# Patient Record
Sex: FEMALE | Race: WHITE | Employment: UNEMPLOYED | URBAN - METROPOLITAN AREA
[De-identification: names, ages, dates, MRNs, and addresses within clinical notes are randomized per-mention and may not be internally consistent; named-entity substitution may affect disease eponyms.]

---

## 2021-02-24 ENCOUNTER — TRANSCRIBE ORDER (OUTPATIENT)
Dept: SCHEDULING | Age: 71
End: 2021-02-24

## 2024-10-23 ENCOUNTER — TRANSCRIBE ORDERS (OUTPATIENT)
Dept: SCHEDULING | Age: 74
End: 2024-10-23

## 2024-10-23 DIAGNOSIS — Z12.31 VISIT FOR SCREENING MAMMOGRAM: Primary | ICD-10-CM

## 2025-01-09 ENCOUNTER — HOSPITAL ENCOUNTER (EMERGENCY)
Age: 75
Discharge: HOME | End: 2025-01-09
Attending: EMERGENCY MEDICINE
Payer: MEDICARE

## 2025-01-09 ENCOUNTER — APPOINTMENT (OUTPATIENT)
Dept: CT IMAGING | Age: 75
End: 2025-01-09
Payer: MEDICARE

## 2025-01-09 VITALS
DIASTOLIC BLOOD PRESSURE: 61 MMHG | SYSTOLIC BLOOD PRESSURE: 159 MMHG | HEART RATE: 80 BPM | OXYGEN SATURATION: 98 % | RESPIRATION RATE: 18 BRPM | TEMPERATURE: 97.8 F

## 2025-01-09 DIAGNOSIS — M54.2 NECK PAIN: ICD-10-CM

## 2025-01-09 DIAGNOSIS — R51.9 ACUTE NONINTRACTABLE HEADACHE, UNSPECIFIED HEADACHE TYPE: Primary | ICD-10-CM

## 2025-01-09 PROCEDURE — 70450 CT HEAD/BRAIN W/O DYE: CPT

## 2025-01-09 PROCEDURE — 96372 THER/PROPH/DIAG INJ SC/IM: CPT

## 2025-01-09 PROCEDURE — 6360000002 HC RX W HCPCS: Performed by: EMERGENCY MEDICINE

## 2025-01-09 PROCEDURE — 72125 CT NECK SPINE W/O DYE: CPT

## 2025-01-09 PROCEDURE — 99284 EMERGENCY DEPT VISIT MOD MDM: CPT

## 2025-01-09 RX ORDER — CYCLOBENZAPRINE HCL 10 MG
10 TABLET ORAL 3 TIMES DAILY PRN
Qty: 21 TABLET | Refills: 0 | Status: SHIPPED | OUTPATIENT
Start: 2025-01-09 | End: 2025-01-19

## 2025-01-09 RX ORDER — PROCHLORPERAZINE EDISYLATE 5 MG/ML
10 INJECTION INTRAMUSCULAR; INTRAVENOUS
Status: COMPLETED | OUTPATIENT
Start: 2025-01-09 | End: 2025-01-09

## 2025-01-09 RX ORDER — KETOROLAC TROMETHAMINE 30 MG/ML
30 INJECTION, SOLUTION INTRAMUSCULAR; INTRAVENOUS
Status: COMPLETED | OUTPATIENT
Start: 2025-01-09 | End: 2025-01-09

## 2025-01-09 RX ADMIN — KETOROLAC TROMETHAMINE 30 MG: 30 INJECTION, SOLUTION INTRAMUSCULAR at 18:41

## 2025-01-09 RX ADMIN — PROCHLORPERAZINE EDISYLATE 10 MG: 5 INJECTION INTRAMUSCULAR; INTRAVENOUS at 18:40

## 2025-01-09 ASSESSMENT — ENCOUNTER SYMPTOMS
VOMITING: 0
BOWEL INCONTINENCE: 0
SORE THROAT: 0
BACK PAIN: 0
COLOR CHANGE: 0
VISUAL CHANGE: 0
VOICE CHANGE: 0
TROUBLE SWALLOWING: 0
PHOTOPHOBIA: 0
NAUSEA: 0
SHORTNESS OF BREATH: 0
CONSTIPATION: 0
COUGH: 0
DIARRHEA: 0
FACIAL SWELLING: 0
ABDOMINAL PAIN: 0
RHINORRHEA: 0

## 2025-01-09 ASSESSMENT — LIFESTYLE VARIABLES
HOW MANY STANDARD DRINKS CONTAINING ALCOHOL DO YOU HAVE ON A TYPICAL DAY: PATIENT DOES NOT DRINK
HOW OFTEN DO YOU HAVE A DRINK CONTAINING ALCOHOL: NEVER

## 2025-01-09 ASSESSMENT — PAIN DESCRIPTION - LOCATION: LOCATION: HEAD

## 2025-01-09 ASSESSMENT — PAIN SCALES - GENERAL: PAINLEVEL_OUTOF10: 7

## 2025-01-09 ASSESSMENT — PAIN DESCRIPTION - DESCRIPTORS: DESCRIPTORS: ACHING

## 2025-01-09 NOTE — ED TRIAGE NOTES
Pt presents to triage via WC c/o intermittent right sided head, neck and shoulder pain and itching xs 3 days. Pt denies fall or mechanical injury.

## 2025-01-09 NOTE — ED PROVIDER NOTES
Constitutional:  Negative for chills and fever.   HENT:  Negative for dental problem, ear pain, facial swelling, rhinorrhea, sore throat, trouble swallowing and voice change.    Eyes:  Negative for photophobia and visual disturbance.   Respiratory:  Negative for cough and shortness of breath.    Cardiovascular:  Negative for chest pain and palpitations.   Gastrointestinal:  Negative for abdominal pain, bowel incontinence, constipation, diarrhea, nausea and vomiting.   Genitourinary:  Negative for bladder incontinence, dysuria and hematuria.   Musculoskeletal:  Positive for neck pain. Negative for back pain.   Skin:  Negative for color change and rash.   Neurological:  Positive for headaches. Negative for tingling, weakness and numbness.   Psychiatric/Behavioral:  Negative for confusion.    All other systems reviewed and are negative.       Physical Exam     Vitals signs and nursing note reviewed:  Vitals:    01/09/25 1457 01/09/25 1501   BP: (!) 143/62    Pulse: 95    Resp:  17   Temp: 97.7 °F (36.5 °C)    TempSrc: Oral    SpO2: 96%       Physical Exam  Vitals and nursing note reviewed.   Constitutional:       Appearance: Normal appearance.   HENT:      Head: Normocephalic and atraumatic.      Right Ear: Tympanic membrane normal.      Nose: Nose normal.      Mouth/Throat:      Mouth: Mucous membranes are dry.      Pharynx: No posterior oropharyngeal erythema.   Eyes:      Extraocular Movements: Extraocular movements intact.      Pupils: Pupils are equal, round, and reactive to light.   Cardiovascular:      Rate and Rhythm: Normal rate and regular rhythm.   Pulmonary:      Effort: Pulmonary effort is normal.      Breath sounds: Normal breath sounds. No wheezing.   Abdominal:      General: Bowel sounds are normal.      Palpations: Abdomen is soft.      Tenderness: There is no abdominal tenderness.   Musculoskeletal:         General: No swelling. Normal range of motion.      Cervical back: Normal range of motion.

## 2025-03-24 ENCOUNTER — APPOINTMENT (OUTPATIENT)
Dept: CT IMAGING | Age: 75
DRG: 871 | End: 2025-03-24
Payer: MEDICARE

## 2025-03-24 ENCOUNTER — HOSPITAL ENCOUNTER (INPATIENT)
Age: 75
LOS: 2 days | Discharge: HOME | DRG: 871 | End: 2025-03-26
Attending: EMERGENCY MEDICINE | Admitting: STUDENT IN AN ORGANIZED HEALTH CARE EDUCATION/TRAINING PROGRAM
Payer: MEDICARE

## 2025-03-24 ENCOUNTER — APPOINTMENT (OUTPATIENT)
Dept: GENERAL RADIOLOGY | Age: 75
DRG: 871 | End: 2025-03-24
Payer: MEDICARE

## 2025-03-24 DIAGNOSIS — R73.9 HYPERGLYCEMIA: ICD-10-CM

## 2025-03-24 DIAGNOSIS — J18.9 MULTIFOCAL PNEUMONIA: Primary | ICD-10-CM

## 2025-03-24 DIAGNOSIS — A41.9 SEPSIS, DUE TO UNSPECIFIED ORGANISM, UNSPECIFIED WHETHER ACUTE ORGAN DYSFUNCTION PRESENT (HCC): ICD-10-CM

## 2025-03-24 DIAGNOSIS — E87.6 HYPOKALEMIA: ICD-10-CM

## 2025-03-24 DIAGNOSIS — R09.02 HYPOXEMIA: ICD-10-CM

## 2025-03-24 PROBLEM — R06.00 DYSPNEA: Status: ACTIVE | Noted: 2025-03-24

## 2025-03-24 PROBLEM — J18.0 BILATERAL BRONCHOPNEUMONIA: Status: ACTIVE | Noted: 2025-03-24

## 2025-03-24 PROBLEM — J96.01 ACUTE HYPOXIC RESPIRATORY FAILURE: Status: ACTIVE | Noted: 2025-03-24

## 2025-03-24 LAB
ALBUMIN SERPL-MCNC: 3.1 G/DL (ref 3.2–4.6)
ALBUMIN/GLOB SERPL: 0.7 (ref 1–1.9)
ALP SERPL-CCNC: 155 U/L (ref 35–104)
ALT SERPL-CCNC: 21 U/L (ref 8–45)
ANION GAP SERPL CALC-SCNC: 16 MMOL/L (ref 7–16)
APPEARANCE UR: ABNORMAL
AST SERPL-CCNC: 24 U/L (ref 15–37)
BACTERIA URNS QL MICRO: ABNORMAL /HPF
BASOPHILS # BLD: 0.03 K/UL (ref 0–0.2)
BASOPHILS NFR BLD: 0.2 % (ref 0–2)
BILIRUB SERPL-MCNC: 0.7 MG/DL (ref 0–1.2)
BILIRUB UR QL: NEGATIVE
BUN SERPL-MCNC: 11 MG/DL (ref 8–23)
CALCIUM SERPL-MCNC: 9.3 MG/DL (ref 8.8–10.2)
CHLORIDE SERPL-SCNC: 90 MMOL/L (ref 98–107)
CO2 SERPL-SCNC: 26 MMOL/L (ref 20–29)
COLOR UR: YELLOW
CREAT SERPL-MCNC: 0.77 MG/DL (ref 0.6–1.1)
D DIMER PPP FEU-MCNC: 3.36 UG/ML(FEU)
DIFFERENTIAL METHOD BLD: ABNORMAL
EKG ATRIAL RATE: 127 BPM
EKG DIAGNOSIS: NORMAL
EKG P AXIS: 58 DEGREES
EKG P-R INTERVAL: 158 MS
EKG Q-T INTERVAL: 304 MS
EKG QRS DURATION: 74 MS
EKG QTC CALCULATION (BAZETT): 441 MS
EKG R AXIS: 79 DEGREES
EKG T AXIS: 41 DEGREES
EKG VENTRICULAR RATE: 127 BPM
EOSINOPHIL # BLD: 0 K/UL (ref 0–0.8)
EOSINOPHIL NFR BLD: 0 % (ref 0.5–7.8)
EPI CELLS #/AREA URNS HPF: ABNORMAL /HPF
ERYTHROCYTE [DISTWIDTH] IN BLOOD BY AUTOMATED COUNT: 13.2 % (ref 11.9–14.6)
FLUAV RNA SPEC QL NAA+PROBE: NOT DETECTED
FLUBV RNA SPEC QL NAA+PROBE: NOT DETECTED
GLOBULIN SER CALC-MCNC: 4.5 G/DL (ref 2.3–3.5)
GLUCOSE BLD STRIP.AUTO-MCNC: 164 MG/DL (ref 65–100)
GLUCOSE BLD STRIP.AUTO-MCNC: 256 MG/DL (ref 65–100)
GLUCOSE BLD STRIP.AUTO-MCNC: 308 MG/DL (ref 65–100)
GLUCOSE SERPL-MCNC: 388 MG/DL (ref 70–99)
GLUCOSE UR STRIP.AUTO-MCNC: 100 MG/DL
HCT VFR BLD AUTO: 36.3 % (ref 35.8–46.3)
HGB BLD-MCNC: 12.1 G/DL (ref 11.7–15.4)
HGB UR QL STRIP: ABNORMAL
IMM GRANULOCYTES # BLD AUTO: 0.11 K/UL (ref 0–0.5)
IMM GRANULOCYTES NFR BLD AUTO: 0.6 % (ref 0–5)
KETONES UR QL STRIP.AUTO: 40 MG/DL
LACTATE SERPL-SCNC: 1.6 MMOL/L (ref 0.5–2)
LEUKOCYTE ESTERASE UR QL STRIP.AUTO: ABNORMAL
LYMPHOCYTES # BLD: 0.9 K/UL (ref 0.5–4.6)
LYMPHOCYTES NFR BLD: 5.1 % (ref 13–44)
MAGNESIUM SERPL-MCNC: 1.9 MG/DL (ref 1.8–2.4)
MCH RBC QN AUTO: 30.6 PG (ref 26.1–32.9)
MCHC RBC AUTO-ENTMCNC: 33.3 G/DL (ref 31.4–35)
MCV RBC AUTO: 91.9 FL (ref 82–102)
MONOCYTES # BLD: 0.95 K/UL (ref 0.1–1.3)
MONOCYTES NFR BLD: 5.4 % (ref 4–12)
NEUTS SEG # BLD: 15.63 K/UL (ref 1.7–8.2)
NEUTS SEG NFR BLD: 88.7 % (ref 43–78)
NITRITE UR QL STRIP.AUTO: NEGATIVE
NRBC # BLD: 0 K/UL (ref 0–0.2)
NT PRO BNP: 279 PG/ML (ref 0–125)
OTHER OBSERVATIONS: ABNORMAL
PH UR STRIP: 6 (ref 5–9)
PLATELET # BLD AUTO: 352 K/UL (ref 150–450)
PMV BLD AUTO: 10 FL (ref 9.4–12.3)
POTASSIUM SERPL-SCNC: 3.3 MMOL/L (ref 3.5–5.1)
PROCALCITONIN SERPL-MCNC: 0.57 NG/ML (ref 0–0.1)
PROT SERPL-MCNC: 7.7 G/DL (ref 6.3–8.2)
PROT UR STRIP-MCNC: NEGATIVE MG/DL
RBC # BLD AUTO: 3.95 M/UL (ref 4.05–5.2)
RBC #/AREA URNS HPF: ABNORMAL /HPF
SARS-COV-2 RDRP RESP QL NAA+PROBE: NOT DETECTED
SERVICE CMNT-IMP: ABNORMAL
SODIUM SERPL-SCNC: 132 MMOL/L (ref 136–145)
SOURCE: NORMAL
SP GR UR REFRACTOMETRY: <1.005 (ref 1–1.02)
UROBILINOGEN UR QL STRIP.AUTO: 0.2 EU/DL (ref 0.2–1)
WBC # BLD AUTO: 17.6 K/UL (ref 4.3–11.1)
WBC URNS QL MICRO: ABNORMAL /HPF
YEAST URNS QL MICRO: ABNORMAL

## 2025-03-24 PROCEDURE — 94640 AIRWAY INHALATION TREATMENT: CPT

## 2025-03-24 PROCEDURE — 96375 TX/PRO/DX INJ NEW DRUG ADDON: CPT

## 2025-03-24 PROCEDURE — 85025 COMPLETE CBC W/AUTO DIFF WBC: CPT

## 2025-03-24 PROCEDURE — 87040 BLOOD CULTURE FOR BACTERIA: CPT

## 2025-03-24 PROCEDURE — 2700000000 HC OXYGEN THERAPY PER DAY

## 2025-03-24 PROCEDURE — 6370000000 HC RX 637 (ALT 250 FOR IP): Performed by: STUDENT IN AN ORGANIZED HEALTH CARE EDUCATION/TRAINING PROGRAM

## 2025-03-24 PROCEDURE — 99285 EMERGENCY DEPT VISIT HI MDM: CPT

## 2025-03-24 PROCEDURE — 87502 INFLUENZA DNA AMP PROBE: CPT

## 2025-03-24 PROCEDURE — 71046 X-RAY EXAM CHEST 2 VIEWS: CPT

## 2025-03-24 PROCEDURE — 83880 ASSAY OF NATRIURETIC PEPTIDE: CPT

## 2025-03-24 PROCEDURE — 83605 ASSAY OF LACTIC ACID: CPT

## 2025-03-24 PROCEDURE — 93005 ELECTROCARDIOGRAM TRACING: CPT | Performed by: EMERGENCY MEDICINE

## 2025-03-24 PROCEDURE — 93010 ELECTROCARDIOGRAM REPORT: CPT | Performed by: INTERNAL MEDICINE

## 2025-03-24 PROCEDURE — 96365 THER/PROPH/DIAG IV INF INIT: CPT

## 2025-03-24 PROCEDURE — 87635 SARS-COV-2 COVID-19 AMP PRB: CPT

## 2025-03-24 PROCEDURE — 1100000000 HC RM PRIVATE

## 2025-03-24 PROCEDURE — 2500000003 HC RX 250 WO HCPCS: Performed by: EMERGENCY MEDICINE

## 2025-03-24 PROCEDURE — 6370000000 HC RX 637 (ALT 250 FOR IP): Performed by: EMERGENCY MEDICINE

## 2025-03-24 PROCEDURE — 85379 FIBRIN DEGRADATION QUANT: CPT

## 2025-03-24 PROCEDURE — 81003 URINALYSIS AUTO W/O SCOPE: CPT

## 2025-03-24 PROCEDURE — 80053 COMPREHEN METABOLIC PANEL: CPT

## 2025-03-24 PROCEDURE — 6360000004 HC RX CONTRAST MEDICATION: Performed by: EMERGENCY MEDICINE

## 2025-03-24 PROCEDURE — 82962 GLUCOSE BLOOD TEST: CPT

## 2025-03-24 PROCEDURE — 71260 CT THORAX DX C+: CPT

## 2025-03-24 PROCEDURE — 36415 COLL VENOUS BLD VENIPUNCTURE: CPT

## 2025-03-24 PROCEDURE — 84145 PROCALCITONIN (PCT): CPT

## 2025-03-24 PROCEDURE — 96366 THER/PROPH/DIAG IV INF ADDON: CPT

## 2025-03-24 PROCEDURE — 83735 ASSAY OF MAGNESIUM: CPT

## 2025-03-24 PROCEDURE — 6360000002 HC RX W HCPCS: Performed by: EMERGENCY MEDICINE

## 2025-03-24 PROCEDURE — 2580000003 HC RX 258: Performed by: EMERGENCY MEDICINE

## 2025-03-24 RX ORDER — ALBUTEROL SULFATE 0.83 MG/ML
2.5 SOLUTION RESPIRATORY (INHALATION)
Status: DISCONTINUED | OUTPATIENT
Start: 2025-03-25 | End: 2025-03-26 | Stop reason: HOSPADM

## 2025-03-24 RX ORDER — ONDANSETRON 4 MG/1
4 TABLET, ORALLY DISINTEGRATING ORAL EVERY 8 HOURS PRN
Status: DISCONTINUED | OUTPATIENT
Start: 2025-03-24 | End: 2025-03-26 | Stop reason: HOSPADM

## 2025-03-24 RX ORDER — PRAVASTATIN SODIUM 80 MG/1
80 TABLET ORAL NIGHTLY
COMMUNITY

## 2025-03-24 RX ORDER — ZOLPIDEM TARTRATE 5 MG/1
5 TABLET ORAL NIGHTLY PRN
Status: DISCONTINUED | OUTPATIENT
Start: 2025-03-24 | End: 2025-03-26 | Stop reason: HOSPADM

## 2025-03-24 RX ORDER — ACETAMINOPHEN 650 MG/1
650 SUPPOSITORY RECTAL EVERY 6 HOURS PRN
Status: DISCONTINUED | OUTPATIENT
Start: 2025-03-24 | End: 2025-03-26 | Stop reason: HOSPADM

## 2025-03-24 RX ORDER — HYDROCODONE BITARTRATE AND ACETAMINOPHEN 10; 325 MG/1; MG/1
1 TABLET ORAL EVERY 6 HOURS PRN
Refills: 0 | Status: DISCONTINUED | OUTPATIENT
Start: 2025-03-24 | End: 2025-03-26 | Stop reason: HOSPADM

## 2025-03-24 RX ORDER — POLYETHYLENE GLYCOL 3350 17 G/17G
17 POWDER, FOR SOLUTION ORAL DAILY PRN
Status: DISCONTINUED | OUTPATIENT
Start: 2025-03-24 | End: 2025-03-26 | Stop reason: HOSPADM

## 2025-03-24 RX ORDER — FAMOTIDINE 20 MG/1
20 TABLET, FILM COATED ORAL 2 TIMES DAILY
COMMUNITY

## 2025-03-24 RX ORDER — ACETAMINOPHEN 325 MG/1
650 TABLET ORAL EVERY 6 HOURS PRN
Status: DISCONTINUED | OUTPATIENT
Start: 2025-03-24 | End: 2025-03-26 | Stop reason: HOSPADM

## 2025-03-24 RX ORDER — INSULIN LISPRO 100 [IU]/ML
0-16 INJECTION, SOLUTION INTRAVENOUS; SUBCUTANEOUS
Status: DISCONTINUED | OUTPATIENT
Start: 2025-03-24 | End: 2025-03-26

## 2025-03-24 RX ORDER — ENOXAPARIN SODIUM 100 MG/ML
40 INJECTION SUBCUTANEOUS DAILY
Status: DISCONTINUED | OUTPATIENT
Start: 2025-03-25 | End: 2025-03-26 | Stop reason: HOSPADM

## 2025-03-24 RX ORDER — METFORMIN HYDROCHLORIDE 500 MG/1
1000 TABLET, EXTENDED RELEASE ORAL
Status: DISCONTINUED | OUTPATIENT
Start: 2025-03-25 | End: 2025-03-24

## 2025-03-24 RX ORDER — METFORMIN HYDROCHLORIDE 500 MG/1
1000 TABLET, EXTENDED RELEASE ORAL 2 TIMES DAILY WITH MEALS
Status: DISCONTINUED | OUTPATIENT
Start: 2025-03-25 | End: 2025-03-26 | Stop reason: HOSPADM

## 2025-03-24 RX ORDER — GABAPENTIN 100 MG/1
100 CAPSULE ORAL 2 TIMES DAILY
Status: DISCONTINUED | OUTPATIENT
Start: 2025-03-24 | End: 2025-03-26 | Stop reason: HOSPADM

## 2025-03-24 RX ORDER — METFORMIN HYDROCHLORIDE 500 MG/1
500 TABLET, EXTENDED RELEASE ORAL
Status: DISCONTINUED | OUTPATIENT
Start: 2025-03-25 | End: 2025-03-24

## 2025-03-24 RX ORDER — 0.9 % SODIUM CHLORIDE 0.9 %
1000 INTRAVENOUS SOLUTION INTRAVENOUS ONCE
Status: COMPLETED | OUTPATIENT
Start: 2025-03-24 | End: 2025-03-24

## 2025-03-24 RX ORDER — POTASSIUM CHLORIDE 7.45 MG/ML
10 INJECTION INTRAVENOUS ONCE
Status: COMPLETED | OUTPATIENT
Start: 2025-03-24 | End: 2025-03-24

## 2025-03-24 RX ORDER — IPRATROPIUM BROMIDE AND ALBUTEROL SULFATE 2.5; .5 MG/3ML; MG/3ML
1 SOLUTION RESPIRATORY (INHALATION)
Status: COMPLETED | OUTPATIENT
Start: 2025-03-24 | End: 2025-03-24

## 2025-03-24 RX ORDER — FAMOTIDINE 20 MG/1
20 TABLET, FILM COATED ORAL 2 TIMES DAILY
Status: DISCONTINUED | OUTPATIENT
Start: 2025-03-24 | End: 2025-03-26 | Stop reason: HOSPADM

## 2025-03-24 RX ORDER — GABAPENTIN 100 MG/1
100 CAPSULE ORAL 3 TIMES DAILY
Status: DISCONTINUED | OUTPATIENT
Start: 2025-03-24 | End: 2025-03-24

## 2025-03-24 RX ORDER — GABAPENTIN 100 MG/1
100 CAPSULE ORAL 2 TIMES DAILY
COMMUNITY

## 2025-03-24 RX ORDER — SODIUM CHLORIDE 0.9 % (FLUSH) 0.9 %
5-40 SYRINGE (ML) INJECTION EVERY 12 HOURS SCHEDULED
Status: DISCONTINUED | OUTPATIENT
Start: 2025-03-24 | End: 2025-03-26 | Stop reason: HOSPADM

## 2025-03-24 RX ORDER — ZOLPIDEM TARTRATE 5 MG/1
5 TABLET ORAL NIGHTLY PRN
COMMUNITY

## 2025-03-24 RX ORDER — HYDROCODONE BITARTRATE AND ACETAMINOPHEN 10; 325 MG/1; MG/1
1 TABLET ORAL EVERY 6 HOURS PRN
COMMUNITY

## 2025-03-24 RX ORDER — INSULIN GLARGINE 100 [IU]/ML
20 INJECTION, SOLUTION SUBCUTANEOUS NIGHTLY
Status: DISCONTINUED | OUTPATIENT
Start: 2025-03-24 | End: 2025-03-24

## 2025-03-24 RX ORDER — SODIUM CHLORIDE 0.9 % (FLUSH) 0.9 %
5-40 SYRINGE (ML) INJECTION PRN
Status: DISCONTINUED | OUTPATIENT
Start: 2025-03-24 | End: 2025-03-26 | Stop reason: HOSPADM

## 2025-03-24 RX ORDER — IOPAMIDOL 755 MG/ML
75 INJECTION, SOLUTION INTRAVASCULAR
Status: COMPLETED | OUTPATIENT
Start: 2025-03-24 | End: 2025-03-24

## 2025-03-24 RX ORDER — SODIUM CHLORIDE 9 MG/ML
INJECTION, SOLUTION INTRAVENOUS PRN
Status: DISCONTINUED | OUTPATIENT
Start: 2025-03-24 | End: 2025-03-26 | Stop reason: HOSPADM

## 2025-03-24 RX ORDER — 0.9 % SODIUM CHLORIDE 0.9 %
750 INTRAVENOUS SOLUTION INTRAVENOUS ONCE
Status: COMPLETED | OUTPATIENT
Start: 2025-03-24 | End: 2025-03-24

## 2025-03-24 RX ORDER — LISINOPRIL 10 MG/1
10 TABLET ORAL NIGHTLY
COMMUNITY

## 2025-03-24 RX ORDER — LISINOPRIL 5 MG/1
10 TABLET ORAL DAILY
Status: DISCONTINUED | OUTPATIENT
Start: 2025-03-25 | End: 2025-03-26 | Stop reason: HOSPADM

## 2025-03-24 RX ORDER — PRAVASTATIN SODIUM 20 MG
80 TABLET ORAL NIGHTLY
Status: DISCONTINUED | OUTPATIENT
Start: 2025-03-24 | End: 2025-03-26 | Stop reason: HOSPADM

## 2025-03-24 RX ORDER — DAPAGLIFLOZIN 5 MG/1
5 TABLET, FILM COATED ORAL NIGHTLY
COMMUNITY

## 2025-03-24 RX ORDER — ONDANSETRON 2 MG/ML
4 INJECTION INTRAMUSCULAR; INTRAVENOUS EVERY 6 HOURS PRN
Status: DISCONTINUED | OUTPATIENT
Start: 2025-03-24 | End: 2025-03-26 | Stop reason: HOSPADM

## 2025-03-24 RX ORDER — INSULIN GLARGINE 100 [IU]/ML
15 INJECTION, SOLUTION SUBCUTANEOUS NIGHTLY
Status: DISCONTINUED | OUTPATIENT
Start: 2025-03-24 | End: 2025-03-26 | Stop reason: HOSPADM

## 2025-03-24 RX ORDER — ALBUTEROL SULFATE 0.83 MG/ML
2.5 SOLUTION RESPIRATORY (INHALATION)
Status: DISCONTINUED | OUTPATIENT
Start: 2025-03-24 | End: 2025-03-24

## 2025-03-24 RX ORDER — METFORMIN HYDROCHLORIDE 500 MG/1
1000 TABLET, EXTENDED RELEASE ORAL 2 TIMES DAILY
COMMUNITY

## 2025-03-24 RX ORDER — FAMOTIDINE 20 MG/1
20 TABLET, FILM COATED ORAL 2 TIMES DAILY PRN
Status: DISCONTINUED | OUTPATIENT
Start: 2025-03-24 | End: 2025-03-24

## 2025-03-24 RX ORDER — GUAIFENESIN 600 MG/1
1200 TABLET, EXTENDED RELEASE ORAL 2 TIMES DAILY
Status: DISCONTINUED | OUTPATIENT
Start: 2025-03-24 | End: 2025-03-26 | Stop reason: HOSPADM

## 2025-03-24 RX ADMIN — POTASSIUM CHLORIDE 10 MEQ: 7.46 INJECTION, SOLUTION INTRAVENOUS at 15:11

## 2025-03-24 RX ADMIN — INSULIN HUMAN 10 UNITS: 100 INJECTION, SOLUTION PARENTERAL at 17:34

## 2025-03-24 RX ADMIN — SODIUM CHLORIDE 1000 ML: 9 INJECTION, SOLUTION INTRAVENOUS at 13:24

## 2025-03-24 RX ADMIN — IPRATROPIUM BROMIDE AND ALBUTEROL SULFATE 1 DOSE: 2.5; .5 SOLUTION RESPIRATORY (INHALATION) at 13:33

## 2025-03-24 RX ADMIN — WATER 1000 MG: 1 INJECTION INTRAMUSCULAR; INTRAVENOUS; SUBCUTANEOUS at 13:25

## 2025-03-24 RX ADMIN — INSULIN GLARGINE 15 UNITS: 100 INJECTION, SOLUTION SUBCUTANEOUS at 22:35

## 2025-03-24 RX ADMIN — PRAVASTATIN SODIUM 80 MG: 20 TABLET ORAL at 22:35

## 2025-03-24 RX ADMIN — ZOLPIDEM TARTRATE 5 MG: 5 TABLET ORAL at 22:35

## 2025-03-24 RX ADMIN — IOPAMIDOL 75 ML: 755 INJECTION, SOLUTION INTRAVENOUS at 15:24

## 2025-03-24 RX ADMIN — GUAIFENESIN 1200 MG: 600 TABLET ORAL at 22:35

## 2025-03-24 RX ADMIN — AZITHROMYCIN MONOHYDRATE 500 MG: 500 INJECTION, POWDER, LYOPHILIZED, FOR SOLUTION INTRAVENOUS at 14:37

## 2025-03-24 RX ADMIN — GABAPENTIN 100 MG: 100 CAPSULE ORAL at 22:36

## 2025-03-24 RX ADMIN — SODIUM CHLORIDE 750 ML: 9 INJECTION, SOLUTION INTRAVENOUS at 14:40

## 2025-03-24 ASSESSMENT — PAIN - FUNCTIONAL ASSESSMENT: PAIN_FUNCTIONAL_ASSESSMENT: 0-10

## 2025-03-24 ASSESSMENT — PAIN SCALES - GENERAL: PAINLEVEL_OUTOF10: 0

## 2025-03-24 NOTE — ED PROVIDER NOTES
Emergency Department Provider Note       PCP: Charlene Stallworth MD   Age: 74 y.o.   Sex: female     DISPOSITION Decision To Admit 03/24/2025 03:45:43 PM    ICD-10-CM    1. Multifocal pneumonia  J18.9       2. Hypoxemia  R09.02       3. Hypokalemia  E87.6       4. Sepsis, due to unspecified organism, unspecified whether acute organ dysfunction present (HCC)  A41.9       5. Hyperglycemia  R73.9           Medical Decision Making     74-year-old female with history of GERD, HTN, former tobacco user who quit 8 days ago, COPD who presents with complaint of worsening fever, chills, shortness of breath, productive cough with green sputum that is worsened over the past 7 to 8 days.    Patient noted to be hypoxic.  Initial O2 sat 86% on room air.  Patient placed on 2 L O2 via nasal cannula.  Patient also noted to be tachycardic.  Afebrile.  Normotensive.  Sepsis protocol orders placed.    Differential diagnosis includes but is not limited to pneumonia, pneumothorax, COPD exacerbation, volume overload, pulmonary embolism, sepsis, etc.    COVID, flu negative  Sodium 132, potassium 3.3.  IV potassium supplementation given. Glucose 388.  Procalcitonin elevated at 0.57.  proBNP 279.  WBC 17.6.  Lactic acid 1.6.  Blood cultures obtained.  Patient covered with Rocephin, Zithromax.  Chest x-ray with severe bronchitis with mild multifocal bronchopneumonia noted.  30 cc/kg IV fluid bolus given as well as DuoNeb x 2.    D-dimer elevated at 3.36.  CT chest report pending.  Reviewed by myself.  No large PE noted.  Multifocal pneumonia noted.  Patient requiring 3 L O2 via nasal cannula.    Hospitalist consulted for admission.  Case discussed with Dr. Key via Dm.    ED Course as of 03/24/25 1548   Mon Mar 24, 2025   1320 CXR FINDINGS: Normal heart size. Mild patchy opacities in the mid and lower lung  zones bilaterally. Suspect mild bronchopneumonia. Diffuse bronchitis. No pleural  effusion or pneumothorax. No acute  RBC 3.95 (L) 4.05 - 5.2 M/uL    Hemoglobin 12.1 11.7 - 15.4 g/dL    Hematocrit 36.3 35.8 - 46.3 %    MCV 91.9 82 - 102 FL    MCH 30.6 26.1 - 32.9 PG    MCHC 33.3 31.4 - 35.0 g/dL    RDW 13.2 11.9 - 14.6 %    Platelets 352 150 - 450 K/uL    MPV 10.0 9.4 - 12.3 FL    nRBC 0.00 0.0 - 0.2 K/uL    Differential Type AUTOMATED      Neutrophils % 88.7 (H) 43.0 - 78.0 %    Lymphocytes % 5.1 (L) 13.0 - 44.0 %    Monocytes % 5.4 4.0 - 12.0 %    Eosinophils % 0.0 (L) 0.5 - 7.8 %    Basophils % 0.2 0.0 - 2.0 %    Immature Granulocytes % 0.6 0.0 - 5.0 %    Neutrophils Absolute 15.63 (H) 1.70 - 8.20 K/UL    Lymphocytes Absolute 0.90 0.50 - 4.60 K/UL    Monocytes Absolute 0.95 0.10 - 1.30 K/UL    Eosinophils Absolute 0.00 0.00 - 0.80 K/UL    Basophils Absolute 0.03 0.00 - 0.20 K/UL    Immature Granulocytes Absolute 0.11 0.0 - 0.5 K/UL   Lactate, Sepsis   Result Value Ref Range    Lactic Acid, Sepsis 1.6 0.5 - 2.0 MMOL/L   Procalcitonin   Result Value Ref Range    Procalcitonin 0.57 (H) 0.00 - 0.10 ng/mL   Urinalysis w/Reflex to Micro   Result Value Ref Range    Color, UA YELLOW      Appearance CLOUDY      Specific Gravity, UA <1.005 1.001 - 1.023    pH, Urine 6.0 5.0 - 9.0      Protein, UA Negative NEG mg/dL    Glucose, Ur 100 (A) NEG mg/dL    Ketones, Urine 40 (A) NEG mg/dL    Bilirubin, Urine Negative NEG      Blood, Urine TRACE (A) NEG      Urobilinogen, Urine 0.2 0.2 - 1.0 EU/dL    Nitrite, Urine Negative NEG      Leukocyte Esterase, Urine TRACE (A) NEG      WBC, UA 5-10 0 /hpf    RBC, UA 3-5 0 /hpf    Epithelial Cells, UA 3-5 0 /hpf    BACTERIA, URINE 1+ (H) 0 /hpf    Yeast, UA OCCASIONAL      Other observations RESULTS VERIFIED MANUALLY     Magnesium   Result Value Ref Range    Magnesium 1.9 1.8 - 2.4 mg/dL   D-Dimer, Quantitative   Result Value Ref Range    D-Dimer, Quant 3.36 (H) <0.56 ug/ml(FEU)   Brain Natriuretic Peptide   Result Value Ref Range    NT Pro- (H) 0 - 125 PG/ML   EKG 12 Lead   Result Value Ref Range

## 2025-03-24 NOTE — ED TRIAGE NOTES
Pt ambulatory unassisted to triage. Pt complains of SOB x8 days. Reports went to her primary doctor today and was told she has probable pneumonia. Denies requiring supplemental oxygen at baseline.

## 2025-03-24 NOTE — ED NOTES
TRANSFER - OUT REPORT:    Verbal report given to Zabrina FRANKLIN on Deana Rollins  being transferred to [facility] for routine progression of patient care       Report consisted of patient's Situation, Background, Assessment and   Recommendations(SBAR).     Information from the following report(s) Nurse Handoff Report was reviewed with the receiving nurse.    [place] Fall Assessment:    Presents to emergency department  because of falls (Syncope, seizure, or loss of consciousness): No  Age > 70: No  Altered Mental Status, Intoxication with alcohol or substance confusion (Disorientation, impaired judgment, poor safety awaremess, or inability to follow instructions): No  Impaired Mobility: Ambulates or transfers with assistive devices or assistance; Unable to ambulate or transer.: No  Nursing Judgement: No          Lines:   Peripheral IV 03/24/25 Left Antecubital (Active)       Peripheral IV 03/24/25 Right;Anterior Forearm (Active)        Opportunity for questions and clarification was provided.      Patient transported with:  Kely Bustos RN  03/24/25 2810

## 2025-03-24 NOTE — H&P
Hospitalist History and Physical   Admit Date:  3/24/2025 12:38 PM   Name:  Deana Rollins   Age:  74 y.o.  Sex:  female  :  1950   MRN:  [id number]   Room:  [place] [address]    Presenting/Chief Complaint: Shortness of Breath     Reason(s) for Admission: Sepsis (HCC) [A41.9]     History of Present Illness:   Deana Rollins is a 74 y.o. female with medical history of hypertension, GERD who presented with shortness of breath, with worsening symptoms over the past week.  Patient states her daughter is also sick at home.  On presentation vitals are pertinent for , /56, 86% saturation on room air.  Patient was placed on supplemental oxygen via nasal cannula.  Labs are pertinent for sodium 132, potassium 3.3, glucose 388, procalcitonin 0.57, WBC 17.6.  UA with trace leukocyte esterase, 1+ bacteria.  Chest x-ray with severe bronchitis with mild multifocal bronchopneumonia.  CT chest negative for PE moderate patchy bronchopneumonia in bilateral lobes.  She received IV fluids based on sepsis protocol.  Patient admitted for further management.      Assessment & Plan:   Sepsis (HCC)  Bilateral bronchopneumonia  Acute hypoxic respiratory failure (HCC)  Dyspnea  Chest x-ray with severe bronchitis with mild multifocal bronchopneumonia  CT chest negative for PE moderate patchy bronchopneumonia in bilateral lobes  WBC: 17.6  Blood culture, urine culture ordered  Continue Rocephin, azithromycin  On Mucinex for cough  On nasal cannula, continue weaning oxygen as tolerated      Hypertension  Continue lisinopril      GERD (gastroesophageal reflux disease)  Continue famotidine      Diabetes (HCC)  Sliding scale, Accu-Cheks  Started on Lantus 20 units nightly  Continue gabapentin 100 mg 3 times daily    Hypokalemia  K: 3.3  Replenished  Repeat BMP in the a.m.      PT/OT evals ordered?  Therapy evals ordered  Diet: No diet orders on file  VTE prophylaxis: Lovenox  Code status: No Order  Code status discussed:  Sepsis    Collection Time: 03/24/25  1:11 PM   Result Value Ref Range    Lactic Acid, Sepsis 1.6 0.5 - 2.0 MMOL/L   Procalcitonin    Collection Time: 03/24/25  1:11 PM   Result Value Ref Range    Procalcitonin 0.57 (H) 0.00 - 0.10 ng/mL   Magnesium    Collection Time: 03/24/25  1:11 PM   Result Value Ref Range    Magnesium 1.9 1.8 - 2.4 mg/dL   D-Dimer, Quantitative    Collection Time: 03/24/25  1:11 PM   Result Value Ref Range    D-Dimer, Quant 3.36 (H) <0.56 ug/ml(FEU)   Brain Natriuretic Peptide    Collection Time: 03/24/25  1:11 PM   Result Value Ref Range    NT Pro- (H) 0 - 125 PG/ML   COVID-19, Rapid    Collection Time: 03/24/25  1:14 PM    Specimen: Nasopharyngeal   Result Value Ref Range    Source NASAL      SARS-CoV-2, Rapid Not detected NOTD     Influenza A/B, Molecular    Collection Time: 03/24/25  1:14 PM    Specimen: Nasopharyngeal   Result Value Ref Range    Influenza A, JASON Not detected NOTD      Influenza B, JASON Not detected NOTD     Urinalysis w/Reflex to Micro    Collection Time: 03/24/25  2:43 PM   Result Value Ref Range    Color, UA YELLOW      Appearance CLOUDY      Specific Gravity, UA <1.005 1.001 - 1.023    pH, Urine 6.0 5.0 - 9.0      Protein, UA Negative NEG mg/dL    Glucose, Ur 100 (A) NEG mg/dL    Ketones, Urine 40 (A) NEG mg/dL    Bilirubin, Urine Negative NEG      Blood, Urine TRACE (A) NEG      Urobilinogen, Urine 0.2 0.2 - 1.0 EU/dL    Nitrite, Urine Negative NEG      Leukocyte Esterase, Urine TRACE (A) NEG      WBC, UA 5-10 0 /hpf    RBC, UA 3-5 0 /hpf    Epithelial Cells, UA 3-5 0 /hpf    BACTERIA, URINE 1+ (H) 0 /hpf    Yeast, UA OCCASIONAL      Other observations RESULTS VERIFIED MANUALLY     POCT Glucose    Collection Time: 03/24/25  3:50 PM   Result Value Ref Range    POC Glucose 308 (H) 65 - 100 mg/dL    Performed by: Ryanne        Recent Labs     03/24/25  1314   COVID19 Not detected       CT CHEST PULMONARY EMBOLISM W CONTRAST  Result Date:  FINDINGS: Normal heart size. Mild patchy opacities in the mid and lower lung zones bilaterally. Suspect mild bronchopneumonia. Diffuse bronchitis. No pleural effusion or pneumothorax. No acute osseous abnormalities.     Severe bronchitis with mild multifocal bronchopneumonia. Electronically signed by Brennan Deluna        Signed:  Micheal Chowdhury MD    Part of this note may have been written by using a voice dictation software.  The note has been proof read but may still contain some grammatical/other typographical errors.     DISPLAY PLAN FREE TEXT

## 2025-03-25 LAB
ANION GAP SERPL CALC-SCNC: 10 MMOL/L (ref 7–16)
BASOPHILS # BLD: 0.03 K/UL (ref 0–0.2)
BASOPHILS NFR BLD: 0.2 % (ref 0–2)
BUN SERPL-MCNC: 6 MG/DL (ref 8–23)
CALCIUM SERPL-MCNC: 8.1 MG/DL (ref 8.8–10.2)
CHLORIDE SERPL-SCNC: 102 MMOL/L (ref 98–107)
CHOLEST SERPL-MCNC: 59 MG/DL (ref 0–200)
CO2 SERPL-SCNC: 26 MMOL/L (ref 20–29)
CREAT SERPL-MCNC: 0.49 MG/DL (ref 0.6–1.1)
DIFFERENTIAL METHOD BLD: ABNORMAL
EOSINOPHIL # BLD: 0.04 K/UL (ref 0–0.8)
EOSINOPHIL NFR BLD: 0.3 % (ref 0.5–7.8)
ERYTHROCYTE [DISTWIDTH] IN BLOOD BY AUTOMATED COUNT: 13.2 % (ref 11.9–14.6)
EST. AVERAGE GLUCOSE BLD GHB EST-MCNC: 190 MG/DL
GLUCOSE BLD STRIP.AUTO-MCNC: 161 MG/DL (ref 65–100)
GLUCOSE BLD STRIP.AUTO-MCNC: 174 MG/DL (ref 65–100)
GLUCOSE BLD STRIP.AUTO-MCNC: 203 MG/DL (ref 65–100)
GLUCOSE BLD STRIP.AUTO-MCNC: 205 MG/DL (ref 65–100)
GLUCOSE SERPL-MCNC: 144 MG/DL (ref 70–99)
HBA1C MFR BLD: 8.3 % (ref 0–5.6)
HCT VFR BLD AUTO: 29.5 % (ref 35.8–46.3)
HDLC SERPL-MCNC: 21 MG/DL (ref 40–60)
HDLC SERPL: 2.8 (ref 0–5)
HGB BLD-MCNC: 9.7 G/DL (ref 11.7–15.4)
IMM GRANULOCYTES # BLD AUTO: 0.11 K/UL (ref 0–0.5)
IMM GRANULOCYTES NFR BLD AUTO: 0.8 % (ref 0–5)
LDLC SERPL CALC-MCNC: 26 MG/DL (ref 0–100)
LYMPHOCYTES # BLD: 1.71 K/UL (ref 0.5–4.6)
LYMPHOCYTES NFR BLD: 11.7 % (ref 13–44)
MAGNESIUM SERPL-MCNC: 1.9 MG/DL (ref 1.8–2.4)
MCH RBC QN AUTO: 30.2 PG (ref 26.1–32.9)
MCHC RBC AUTO-ENTMCNC: 32.9 G/DL (ref 31.4–35)
MCV RBC AUTO: 91.9 FL (ref 82–102)
MONOCYTES # BLD: 1 K/UL (ref 0.1–1.3)
MONOCYTES NFR BLD: 6.8 % (ref 4–12)
NEUTS SEG # BLD: 11.77 K/UL (ref 1.7–8.2)
NEUTS SEG NFR BLD: 80.2 % (ref 43–78)
NRBC # BLD: 0 K/UL (ref 0–0.2)
PLATELET # BLD AUTO: 297 K/UL (ref 150–450)
PMV BLD AUTO: 9.8 FL (ref 9.4–12.3)
POTASSIUM SERPL-SCNC: 3.1 MMOL/L (ref 3.5–5.1)
RBC # BLD AUTO: 3.21 M/UL (ref 4.05–5.2)
SERVICE CMNT-IMP: ABNORMAL
SODIUM SERPL-SCNC: 138 MMOL/L (ref 136–145)
TRIGL SERPL-MCNC: 62 MG/DL (ref 0–150)
VLDLC SERPL CALC-MCNC: 12 MG/DL (ref 6–23)
WBC # BLD AUTO: 14.7 K/UL (ref 4.3–11.1)

## 2025-03-25 PROCEDURE — 97165 OT EVAL LOW COMPLEX 30 MIN: CPT

## 2025-03-25 PROCEDURE — 94760 N-INVAS EAR/PLS OXIMETRY 1: CPT

## 2025-03-25 PROCEDURE — 94761 N-INVAS EAR/PLS OXIMETRY MLT: CPT

## 2025-03-25 PROCEDURE — 2580000003 HC RX 258: Performed by: STUDENT IN AN ORGANIZED HEALTH CARE EDUCATION/TRAINING PROGRAM

## 2025-03-25 PROCEDURE — 2700000000 HC OXYGEN THERAPY PER DAY

## 2025-03-25 PROCEDURE — 6360000002 HC RX W HCPCS: Performed by: STUDENT IN AN ORGANIZED HEALTH CARE EDUCATION/TRAINING PROGRAM

## 2025-03-25 PROCEDURE — 94640 AIRWAY INHALATION TREATMENT: CPT

## 2025-03-25 PROCEDURE — 6370000000 HC RX 637 (ALT 250 FOR IP): Performed by: STUDENT IN AN ORGANIZED HEALTH CARE EDUCATION/TRAINING PROGRAM

## 2025-03-25 PROCEDURE — 85025 COMPLETE CBC W/AUTO DIFF WBC: CPT

## 2025-03-25 PROCEDURE — 82962 GLUCOSE BLOOD TEST: CPT

## 2025-03-25 PROCEDURE — 97161 PT EVAL LOW COMPLEX 20 MIN: CPT

## 2025-03-25 PROCEDURE — 2500000003 HC RX 250 WO HCPCS: Performed by: STUDENT IN AN ORGANIZED HEALTH CARE EDUCATION/TRAINING PROGRAM

## 2025-03-25 PROCEDURE — 1100000000 HC RM PRIVATE

## 2025-03-25 PROCEDURE — 83036 HEMOGLOBIN GLYCOSYLATED A1C: CPT

## 2025-03-25 PROCEDURE — 80048 BASIC METABOLIC PNL TOTAL CA: CPT

## 2025-03-25 PROCEDURE — 97530 THERAPEUTIC ACTIVITIES: CPT

## 2025-03-25 PROCEDURE — 97535 SELF CARE MNGMENT TRAINING: CPT

## 2025-03-25 PROCEDURE — 80061 LIPID PANEL: CPT

## 2025-03-25 PROCEDURE — 83735 ASSAY OF MAGNESIUM: CPT

## 2025-03-25 PROCEDURE — 6370000000 HC RX 637 (ALT 250 FOR IP): Performed by: INTERNAL MEDICINE

## 2025-03-25 PROCEDURE — 36415 COLL VENOUS BLD VENIPUNCTURE: CPT

## 2025-03-25 RX ORDER — POTASSIUM CHLORIDE 1500 MG/1
40 TABLET, EXTENDED RELEASE ORAL ONCE
Status: COMPLETED | OUTPATIENT
Start: 2025-03-25 | End: 2025-03-25

## 2025-03-25 RX ADMIN — ALBUTEROL SULFATE 2.5 MG: 2.5 SOLUTION RESPIRATORY (INHALATION) at 15:49

## 2025-03-25 RX ADMIN — GABAPENTIN 100 MG: 100 CAPSULE ORAL at 07:37

## 2025-03-25 RX ADMIN — INSULIN LISPRO 4 UNITS: 100 INJECTION, SOLUTION INTRAVENOUS; SUBCUTANEOUS at 12:00

## 2025-03-25 RX ADMIN — ENOXAPARIN SODIUM 40 MG: 100 INJECTION SUBCUTANEOUS at 07:42

## 2025-03-25 RX ADMIN — INSULIN LISPRO 4 UNITS: 100 INJECTION, SOLUTION INTRAVENOUS; SUBCUTANEOUS at 20:46

## 2025-03-25 RX ADMIN — ALBUTEROL SULFATE 2.5 MG: 2.5 SOLUTION RESPIRATORY (INHALATION) at 22:33

## 2025-03-25 RX ADMIN — AZITHROMYCIN MONOHYDRATE 500 MG: 500 INJECTION, POWDER, LYOPHILIZED, FOR SOLUTION INTRAVENOUS at 14:50

## 2025-03-25 RX ADMIN — INSULIN GLARGINE 15 UNITS: 100 INJECTION, SOLUTION SUBCUTANEOUS at 20:46

## 2025-03-25 RX ADMIN — GUAIFENESIN 1200 MG: 600 TABLET ORAL at 07:38

## 2025-03-25 RX ADMIN — ALBUTEROL SULFATE 2.5 MG: 2.5 SOLUTION RESPIRATORY (INHALATION) at 11:00

## 2025-03-25 RX ADMIN — GABAPENTIN 100 MG: 100 CAPSULE ORAL at 20:46

## 2025-03-25 RX ADMIN — SODIUM CHLORIDE: 9 INJECTION, SOLUTION INTRAVENOUS at 14:49

## 2025-03-25 RX ADMIN — FAMOTIDINE 20 MG: 20 TABLET, FILM COATED ORAL at 07:37

## 2025-03-25 RX ADMIN — ALBUTEROL SULFATE 2.5 MG: 2.5 SOLUTION RESPIRATORY (INHALATION) at 07:25

## 2025-03-25 RX ADMIN — SODIUM CHLORIDE, PRESERVATIVE FREE 10 ML: 5 INJECTION INTRAVENOUS at 07:40

## 2025-03-25 RX ADMIN — POTASSIUM CHLORIDE 40 MEQ: 20 TABLET, EXTENDED RELEASE ORAL at 14:34

## 2025-03-25 RX ADMIN — WATER 1000 MG: 1 INJECTION INTRAMUSCULAR; INTRAVENOUS; SUBCUTANEOUS at 14:35

## 2025-03-25 RX ADMIN — GUAIFENESIN 1200 MG: 600 TABLET ORAL at 20:45

## 2025-03-25 RX ADMIN — FAMOTIDINE 20 MG: 20 TABLET, FILM COATED ORAL at 20:46

## 2025-03-25 RX ADMIN — METFORMIN HYDROCHLORIDE 1000 MG: 500 TABLET, EXTENDED RELEASE ORAL at 07:37

## 2025-03-25 RX ADMIN — SODIUM CHLORIDE, PRESERVATIVE FREE 10 ML: 5 INJECTION INTRAVENOUS at 20:46

## 2025-03-25 RX ADMIN — PRAVASTATIN SODIUM 80 MG: 20 TABLET ORAL at 20:45

## 2025-03-25 ASSESSMENT — PAIN SCALES - GENERAL
PAINLEVEL_OUTOF10: 0
PAINLEVEL_OUTOF10: 0

## 2025-03-25 NOTE — ACP (ADVANCE CARE PLANNING)
Advance Care Planning     Advance Care Planning Activator (Inpatient)  Conversation Note      Health Care Decision Maker:  No LW/HCPOA on file, patient aware legal next of kin remain decision maker until document provided.      Current Designated Health Care Decision Maker:     Primary Decision Maker: Awilda Rollins - Child - [phone]    Primary Decision Maker: Kandace Max-legally adopted per patient - Grandchild - [phone]      Care Preferences Full Code per MD order

## 2025-03-25 NOTE — PROGRESS NOTES
Student notes are for training only, and should not be used to guide medical decision making.      Medical Student Progress Note   Admit Date:  3/24/2025 12:38 PM   Name:  Deana Rollins   Age:  74 y.o.  Sex:  female  :  1950   MRN:  [id number]   Room:  [facility]    Presenting Complaint: Shortness of Breath    Reason(s) for Admission: Hypoxemia [R09.02]  Hypokalemia [E87.6]  Hyperglycemia [R73.9]  Sepsis (HCC) [A41.9]  Multifocal pneumonia [J18.9]  Sepsis, due to unspecified organism, unspecified whether acute organ dysfunction present (HCC) [A41.9]     Hospital Course:   Deana Rollins is a 74 y.o. female with medical history of hypertension, GERD who presented with shortness of breath, with worsening symptoms over the past week.  Patient states her daughter is also sick at home.  On presentation vitals are pertinent for , /56, 86% saturation on room air.  Patient was placed on supplemental oxygen via nasal cannula.  Labs are pertinent for sodium 132, potassium 3.3, glucose 388, procalcitonin 0.57, WBC 17.6.  UA with trace leukocyte esterase, 1+ bacteria.  Chest x-ray with severe bronchitis with mild multifocal bronchopneumonia.  CT chest negative for PE moderate patchy bronchopneumonia in bilateral lobes.  She received IV fluids based on sepsis protocol.  Patient admitted for further management   .   Subjective & 24-hour events:  Deana Rollins is a 74 year old female presenting with bilateral bronchopneumonia and sepsis. She stated that her dyspnea has improved. There has been minimal amount of sputum coughed up today. Chest pain was denied. Lower extremity swelling was denied. When asked how she was feeling today she said \"I am feeling better.\" The medications she has been taking have been working well with no reported side effects. There was no reported history of recurrent pneumonia. The patient is a smoker, but reported stopping 8-10 days ago when symptoms arose.    Assessment &  (!) 89 %   03/24/25 1613 -- (!) 116 (!) 35 (!) 121/53 93 %   03/24/25 1558 -- (!) 115 21 (!) 118/58 96 %   03/24/25 1543 -- (!) 115 25 (!) 121/58 96 %   03/24/25 1533 -- (!) 115 23 (!) 123/106 96 %   03/24/25 1513 -- (!) 121 25 (!) 114/52 94 %   03/24/25 1502 -- (!) 118 29 (!) 116/56 94 %   03/24/25 1445 -- (!) 114 28 (!) 120/51 93 %   03/24/25 1435 -- (!) 116 30 (!) 128/54 90 %   03/24/25 1415 -- (!) 114 20 (!) 116/55 94 %   03/24/25 1400 -- (!) 114 21 (!) 122/52 95 %   03/24/25 1345 -- (!) 115 18 (!) 124/52 93 %   03/24/25 1208 -- -- 20 -- --   03/24/25 1207 98.6 °F (37 °C) (!) 126 20 (!) 135/56 91 %   03/24/25 1205 -- -- -- -- (!) 86 %       Oxygen Therapy  SpO2: 96 %  Pulse via Oximetry: 99 beats per minute  Pulse Oximeter Device Mode: Intermittent  Pulse Oximeter Device Location: Finger  O2 Device: Nasal cannula  Skin Assessment: Clean, dry, & intact  Skin Protection for O2 Device: No  O2 Flow Rate (L/min): 2 L/min (weaned from 3L)    Estimated body mass index is 23.64 kg/m² as calculated from the following:    Height as of this encounter: 1.549 m (5' 1\").    Weight as of this encounter: 56.7 kg (125 lb 1.6 oz).    Intake/Output Summary (Last 24 hours) at 3/25/2025 1117  Last data filed at 3/25/2025 0725  Gross per 24 hour   Intake 720 ml   Output --   Net 720 ml         Physical Exam:     Blood pressure (!) 108/52, pulse 82, temperature 98.4 °F (36.9 °C), temperature source Oral, resp. rate 16, height 1.549 m (5' 1\"), weight 56.7 kg (125 lb 1.6 oz), SpO2 96%.  General:    Well nourished. Alert and Oriented    Head:  Normocephalic, atraumatic  CV:   RRR.  No m/r/g.  No jugular venous distension.  Lungs:   Rhonchi on left crackles on right  Abdomen:   Soft, nontender, nondistended.  Extremities: No cyanosis or clubbing.  No edema  Skin:     No rashes and normal coloration.   Warm and dry.     Psych:  Normal mood and affect.      Recent Labs:  Recent Results (from the past 48 hours)   EKG 12 Lead    Collection

## 2025-03-25 NOTE — PLAN OF CARE
Problem: Respiratory - Adult  Goal: Achieves optimal ventilation and oxygenation  Outcome: Progressing  Flowsheets (Taken 3/25/2025 0726)  Achieves optimal ventilation and oxygenation:   Assess for changes in respiratory status   Position to facilitate oxygenation and minimize respiratory effort   Respiratory therapy support as indicated   Assess for changes in mentation and behavior   Oxygen supplementation based on oxygen saturation or arterial blood gases   Encourage broncho-pulmonary hygiene including cough, deep breathe, incentive spirometry   Assess and instruct to report shortness of breath or any respiratory difficulty

## 2025-03-25 NOTE — PROGRESS NOTES
ACUTE OCCUPATIONAL THERAPY GOALS:   (Developed with and agreed upon by patient and/or caregiver.)  1. Patient will complete total body bathing and dressing with modified independence and adaptive equipment as needed.   2. Patient will complete toileting with modified independence.   3. Patient will tolerate 30 minutes of OT treatment with 1-2 rest breaks to increase activity tolerance for ADLs.   4. Patient will complete functional transfers and functional mobility with independence and good safety awareness.     Timeframe: 7 visits       OCCUPATIONAL THERAPY Initial Assessment, Daily Note, and AM       OT Visit Days: 1  Acknowledge Orders  Time  OT Charge Capture  Rehab Caseload Tracker      Deana Rollins is a 74 y.o. female   PRIMARY DIAGNOSIS: Sepsis (HCC)  Hypoxemia [R09.02]  Hypokalemia [E87.6]  Hyperglycemia [R73.9]  Sepsis (HCC) [A41.9]  Multifocal pneumonia [J18.9]  Sepsis, due to unspecified organism, unspecified whether acute organ dysfunction present (HCC) [A41.9]       Reason for Referral: Generalized Muscle Weakness (M62.81)  Other lack of cordination (R27.8)  Inpatient: Payor: [employer] MEDICARE / Plan: [employer] GOLD PLUS HMO / Product Type: *No Product type* /     ASSESSMENT:     REHAB RECOMMENDATIONS:   Recommendation to date pending progress:  Setting:  No further skilled occupational therapy after discharge from hospital    Equipment:    To Be Determined     ASSESSMENT:  Ms. Rollins presents to the hospital with pneumonia and the above stated. Pt is currently on 2L of O2 but does not wear O2 at baseline. Pt is pleasant and appropriate for her age. Pt reports living with her 3 grandchildren (18/13/12 y/o) and 3 dogs and typically she is independent and active at baseline. Pt's O2 is at 93% at rest on 2 L. Pt completed observed functional transfers, ADL, and functional mobility during session with SBA. Pt denies any significant complaints. No major losses of balance with tasks. Pt's fingers are

## 2025-03-25 NOTE — PROGRESS NOTES
ACUTE PHYSICAL THERAPY GOALS:   (Developed with and agreed upon by patient and/or caregiver.)    (1.) Deana Rollins  will move from supine to sit and sit to supine  with INDEPENDENT within 7 treatment day(s).    (2.) Deana Rollins will transfer from bed to chair and chair to bed with INDEPENDENT using the least restrictive device within 7 treatment day(s).    (3.) Deana Rollins will ambulate with INDEPENDENT for 500 feet with the least restrictive device within 7 treatment day(s).   (4.) Deana Rollins will perform standing static and dynamic balance activities x 10 minutes with INDEPENDENT to improve safety within 7 treatment day(s).  (5.) Deana Rollins will ascend and descend 1 stair using no hand rail(s) with SUPERVISION to improve functional mobility and safety within 7 treatment day(s).  (6.) Deana Rollins will perform therapeutic exercises x 10 min for HEP with SUPERVISION to improve strength, endurance, and functional mobility within 7 treatment day(s).      PHYSICAL THERAPY Initial Assessment, Daily Note, and AM  (Link to Caseload Tracking: PT Visit Days : 1  Acknowledge Orders  Time In/Out  PT Charge Capture  Rehab Caseload Tracker    Deana Rollins is a 74 y.o. female   PRIMARY DIAGNOSIS: Sepsis (HCC)  Hypoxemia [R09.02]  Hypokalemia [E87.6]  Hyperglycemia [R73.9]  Sepsis (HCC) [A41.9]  Multifocal pneumonia [J18.9]  Sepsis, due to unspecified organism, unspecified whether acute organ dysfunction present (HCC) [A41.9]       Reason for Referral: Generalized Muscle Weakness (M62.81)  Inpatient: Payor: [employer] MEDICARE / Plan: [employer] GOLD PLUS HMO / Product Type: *No Product type* /     ASSESSMENT:     REHAB RECOMMENDATIONS:   Recommendation to date pending progress:  Setting:  No further skilled physical therapy after discharge from hospital    Equipment:    None     ASSESSMENT:  Ms. Rollins is a 74 year old female who presents to  hospital secondary to above diagnosis. Pt  reports that she lives with 3 grandkids in 1 level home with 1 CORY, reports being very active at baseline, I with mob and ADLs no AD, no home O2. Today pt presents on 2L O2, 93% at rest. This date pt performs mobility including sup to sit, STS, amb in room and amb in hallway 200' no AD with SBA for safety, assisted back to room and up to chair, RN in room. Pt presents as functioning below her baseline, with deficits in mobility including transfers, gait, balance, and activity tolerance. Pt will benefit from skilled therapy services to address stated deficits to promote return to highest level of function, independence, and safety. Will continue to follow. .     [facility] AM-[facility]™ “6 Clicks” Basic Mobility Inpatient Short Form  -PAC Basic Mobility - Inpatient   How much help is needed turning from your back to your side while in a flat bed without using bedrails?: None  How much help is needed moving from lying on your back to sitting on the side of a flat bed without using bedrails?: None  How much help is needed moving to and from a bed to a chair?: None  How much help is needed standing up from a chair using your arms?: None  How much help is needed walking in hospital room?: A Little  How much help is needed climbing 3-5 steps with a railing?: A Little  -[facility] Inpatient Mobility Raw Score : 22  AM-PAC Inpatient T-Scale Score : 53.28  Mobility Inpatient CMS 0-100% Score: 20.91  Mobility Inpatient CMS G-Code Modifier : CJ    SUBJECTIVE:   Ms. Rollins states, \"I have 3 dogs\"     Social/Functional Lives With: Family  Type of Home: House  Home Layout: One level  Home Access: Stairs to enter without rails  Entrance Stairs - Number of Steps: 1  Home Equipment: None    OBJECTIVE:     PAIN: VITALS / O2: PRECAUTION / LINES / DRAINS:   Pre Treatment:   Pain Assessment: None - Denies Pain      Post Treatment: none stated Vitals        Oxygen   2L O2   None    RESTRICTIONS/PRECAUTIONS:                    GROSS

## 2025-03-25 NOTE — CARE COORDINATION
Case Management Assessment  Initial Evaluation    Date/Time of Evaluation: 3/25/2025 2:53 PM  Assessment Completed by: RUSTAM LINDA    If patient is discharged prior to next notation, then this note serves as note for discharge by case management.    Patient Name: Deana Rollins                   YOB: 1950  Diagnosis:   Hypoxemia [R09.02]  Hypokalemia [E87.6]  Hyperglycemia [R73.9]  Sepsis (HCC) [A41.9]  Multifocal pneumonia [J18.9]  Sepsis, due to unspecified organism, unspecified whether acute organ dysfunction present (HCC) [A41.9]                   Date / Time: 3/24/2025 12:38 PM    Patient Admission Status: Inpatient   Readmission Risk (Low < 19, Mod (19-27), High > 27): Readmission Risk Score: 11.4        Current PCP: Charlene Stallworth MD  PCP verified by CM? (P) Yes    Chart Reviewed: Yes      History Provided by: (P) Patient  Patient Orientation: (P) Alert and Oriented    Patient Cognition: (P) Alert    Hospitalization in the last 30 days (Readmission):  No    If yes, Readmission Assessment in CM Navigator will be completed.    Advance Directives:      Code Status: Full Code   Patient's Primary Decision Maker is: (P) Legal Next of Kin      Discharge Planning:    Patient lives with: (P) Family Members, Children Type of Home: (P) House  Primary Care Giver: (P) Self  Patient Support Systems include: (P) Children, Family Members   Current Financial resources: (P) Medicare  Current community resources: (P) None  Current services prior to admission: (P) None            Current DME:              Type of Home Care services:       ADLS  Prior functional level: (P) Independent in ADLs/IADLs  Current functional level: (P) Independent in ADLs/IADLs    PT AM-PAC: 22 /24  OT AM-PAC:   /24    Family can provide assistance at DC: (P) Yes  Would you like Case Management to discuss the discharge plan with any other family members/significant others, and if so, who? (P) Yes  Plans to Return

## 2025-03-25 NOTE — PROGRESS NOTES
Hospitalist Progress Note   Admit Date:  3/24/2025 12:38 PM   Name:  Deana Rollins   Age:  74 y.o.  Sex:  female  :  1950   MRN:  [id number]   Room:  [facility]/    Presenting/Chief Complaint: Shortness of Breath     Reason(s) for Admission: Hypoxemia [R09.02]  Hypokalemia [E87.6]  Hyperglycemia [R73.9]  Sepsis (HCC) [A41.9]  Multifocal pneumonia [J18.9]  Sepsis, due to unspecified organism, unspecified whether acute organ dysfunction present (HCC) [A41.9]     Hospital Course:   Deana Rollins is a 74 y.o. female with medical history of hypertension, GERD who presented with shortness of breath, with worsening symptoms over the past week.  Patient states her daughter is also sick at home.  On presentation vitals are pertinent for , /56, 86% saturation on room air.  Patient was placed on supplemental oxygen via nasal cannula.  Labs are pertinent for sodium 132, potassium 3.3, glucose 388, procalcitonin 0.57, WBC 17.6.  UA with trace leukocyte esterase, 1+ bacteria.  Chest x-ray with severe bronchitis with mild multifocal bronchopneumonia.  CT chest negative for PE moderate patchy bronchopneumonia in bilateral lobes.  She received IV fluids based on sepsis protocol.  Patient admitted for further management.       Subjective & 24hr Events:   Patient was seen and eval at bedside this morning.  States that she feels better.  Denies any worsening shortness of breath, chest pain, nausea, vomiting, fevers, chills.  Remains on supplemental O2.      Assessment & Plan:     Sepsis secondary to bilateral pneumonia  Acute hypoxic respiratory failure (HCC)  CT negative for PE but does show patchy bronchopneumonia in bilateral lobes.  White blood cell count appears to be trending downwards with antibiotics.  Remains afebrile.  Still on 2 L nasal cannula  -Continue wean down O2 as tolerated  -Continue with ceftriaxone azithromycin  -Follow-up blood culture and urine culture  -Maintain MAP greater than  65  -Continue mucinex, albuterol nebs      Hypertension  Blood pressure normotensive.  Will continue with home dose lisinopril.        GERD (gastroesophageal reflux disease)  Continue famotidine      Diabetes (HCC) with neuropathy  Glucose levels ranging between 1  after starting Lantus.  -Continue Lantus 15U nightly  -Continue sliding scale, diabetic diet  -Continue gabapentin 3 times daily    Hypokalemia  Replete per protocol.  Repeat BMP tomorrow morning.    Hyperlipidemia  Continue statin    Active smoker  Patient reports desire to quit smoking.  The patient was counseled on the dangers and complications of tobacco use, and was advised to quit.  Educated on possible strategies, including removal of smoking materials from environment, pharmacotherapy, and/or cessation programs.      Time spent: 5 minutes      Anticipated Discharge Arrangements:   Home    PT/OT evals ordered?  Not ordered; patient not expected to need rehab  Diet:  ADULT DIET; Regular; 3 carb choices (45 gm/meal)  VTE prophylaxis: Lovenox  Code status: Full Code      Non-peripheral Lines and Tubes (if present):          Telemetry (if present):  Cardiac/Telemetry Monitor On: Bedside monitor in use        Hospital Problems:  Principal Problem:    Sepsis (HCC)  Active Problems:    Hypertension    GERD (gastroesophageal reflux disease)    Diabetes (HCC)    Dyspnea    Acute hypoxic respiratory failure (HCC)    Hypokalemia    Bilateral bronchopneumonia  Resolved Problems:    * No resolved hospital problems. *      Objective:   Patient Vitals for the past 24 hrs:   Temp Pulse Resp BP SpO2   03/25/25 1200 -- -- -- -- 94 %   03/25/25 1145 99 °F (37.2 °C) 100 -- (!) 118/56 92 %   03/25/25 1100 -- 82 16 -- 96 %   03/25/25 0755 98.4 °F (36.9 °C) 77 15 (!) 108/52 94 %   03/25/25 0725 -- 89 16 -- 95 %   03/25/25 0429 98.6 °F (37 °C) 84 18 108/60 94 %   03/24/25 2356 98.4 °F (36.9 °C) 100 20 (!) 122/59 95 %   03/24/25 2129 98.8 °F (37.1 °C) 81 18 (!)

## 2025-03-25 NOTE — NURSE NAVIGATOR
This RN Navigator introduced self to patient and/or family at patient bedside. Patient informed that RN Navigator will be following patient for at least 30 days post discharge to act as a resource for any needs that may arise following discharge in relation to their sepsis diagnosis and treatment.     Patient verbalizes understanding of generalized education of sepsis disease process, s/s to monitor for and when to contact physician or visit Emergency Department.   Sepsis education sheet given to patient with this RN Navigator contact information. Patient informed they will be contacted 48-72 hours following discharge.   Contact information verified by patient and/or family member. RN Navigator will continue to follow.       Met with patient and cousin/neighbor at bedside. Patient is A&Ox3, is sole  of 3 grandchildren (18, 13,11), states she quit smoking when she became ill this time. In addition to sepsis education, review of COPD and smoking correlation completed.

## 2025-03-26 VITALS
HEIGHT: 61 IN | WEIGHT: 125.1 LBS | TEMPERATURE: 98.1 F | RESPIRATION RATE: 20 BRPM | OXYGEN SATURATION: 93 % | HEART RATE: 79 BPM | SYSTOLIC BLOOD PRESSURE: 122 MMHG | BODY MASS INDEX: 23.62 KG/M2 | DIASTOLIC BLOOD PRESSURE: 55 MMHG

## 2025-03-26 LAB
ANION GAP SERPL CALC-SCNC: 11 MMOL/L (ref 7–16)
BASOPHILS # BLD: 0.02 K/UL (ref 0–0.2)
BASOPHILS NFR BLD: 0.2 % (ref 0–2)
BUN SERPL-MCNC: 6 MG/DL (ref 8–23)
CALCIUM SERPL-MCNC: 8.2 MG/DL (ref 8.8–10.2)
CHLORIDE SERPL-SCNC: 101 MMOL/L (ref 98–107)
CO2 SERPL-SCNC: 27 MMOL/L (ref 20–29)
CREAT SERPL-MCNC: 0.49 MG/DL (ref 0.6–1.1)
DIFFERENTIAL METHOD BLD: ABNORMAL
EOSINOPHIL # BLD: 0.14 K/UL (ref 0–0.8)
EOSINOPHIL NFR BLD: 1.4 % (ref 0.5–7.8)
ERYTHROCYTE [DISTWIDTH] IN BLOOD BY AUTOMATED COUNT: 13.2 % (ref 11.9–14.6)
GLUCOSE BLD STRIP.AUTO-MCNC: 114 MG/DL (ref 65–100)
GLUCOSE BLD STRIP.AUTO-MCNC: 148 MG/DL (ref 65–100)
GLUCOSE SERPL-MCNC: 107 MG/DL (ref 70–99)
HCT VFR BLD AUTO: 30.1 % (ref 35.8–46.3)
HGB BLD-MCNC: 9.8 G/DL (ref 11.7–15.4)
IMM GRANULOCYTES # BLD AUTO: 0.12 K/UL (ref 0–0.5)
IMM GRANULOCYTES NFR BLD AUTO: 1.2 % (ref 0–5)
LYMPHOCYTES # BLD: 1.62 K/UL (ref 0.5–4.6)
LYMPHOCYTES NFR BLD: 15.7 % (ref 13–44)
MAGNESIUM SERPL-MCNC: 2 MG/DL (ref 1.8–2.4)
MCH RBC QN AUTO: 30.1 PG (ref 26.1–32.9)
MCHC RBC AUTO-ENTMCNC: 32.6 G/DL (ref 31.4–35)
MCV RBC AUTO: 92.3 FL (ref 82–102)
MONOCYTES # BLD: 0.82 K/UL (ref 0.1–1.3)
MONOCYTES NFR BLD: 7.9 % (ref 4–12)
NEUTS SEG # BLD: 7.63 K/UL (ref 1.7–8.2)
NEUTS SEG NFR BLD: 73.6 % (ref 43–78)
NRBC # BLD: 0 K/UL (ref 0–0.2)
PLATELET # BLD AUTO: 342 K/UL (ref 150–450)
PMV BLD AUTO: 9.9 FL (ref 9.4–12.3)
POTASSIUM SERPL-SCNC: 3.1 MMOL/L (ref 3.5–5.1)
RBC # BLD AUTO: 3.26 M/UL (ref 4.05–5.2)
SERVICE CMNT-IMP: ABNORMAL
SERVICE CMNT-IMP: ABNORMAL
SODIUM SERPL-SCNC: 138 MMOL/L (ref 136–145)
WBC # BLD AUTO: 10.4 K/UL (ref 4.3–11.1)

## 2025-03-26 PROCEDURE — 6360000002 HC RX W HCPCS: Performed by: STUDENT IN AN ORGANIZED HEALTH CARE EDUCATION/TRAINING PROGRAM

## 2025-03-26 PROCEDURE — 2700000000 HC OXYGEN THERAPY PER DAY

## 2025-03-26 PROCEDURE — 94640 AIRWAY INHALATION TREATMENT: CPT

## 2025-03-26 PROCEDURE — 85025 COMPLETE CBC W/AUTO DIFF WBC: CPT

## 2025-03-26 PROCEDURE — 94760 N-INVAS EAR/PLS OXIMETRY 1: CPT

## 2025-03-26 PROCEDURE — 2500000003 HC RX 250 WO HCPCS: Performed by: STUDENT IN AN ORGANIZED HEALTH CARE EDUCATION/TRAINING PROGRAM

## 2025-03-26 PROCEDURE — 83735 ASSAY OF MAGNESIUM: CPT

## 2025-03-26 PROCEDURE — 36415 COLL VENOUS BLD VENIPUNCTURE: CPT

## 2025-03-26 PROCEDURE — 6370000000 HC RX 637 (ALT 250 FOR IP): Performed by: INTERNAL MEDICINE

## 2025-03-26 PROCEDURE — 6370000000 HC RX 637 (ALT 250 FOR IP): Performed by: STUDENT IN AN ORGANIZED HEALTH CARE EDUCATION/TRAINING PROGRAM

## 2025-03-26 PROCEDURE — 82962 GLUCOSE BLOOD TEST: CPT

## 2025-03-26 PROCEDURE — 80048 BASIC METABOLIC PNL TOTAL CA: CPT

## 2025-03-26 RX ORDER — CEFDINIR 300 MG/1
300 CAPSULE ORAL 2 TIMES DAILY
Qty: 10 CAPSULE | Refills: 0 | Status: SHIPPED | OUTPATIENT
Start: 2025-03-26 | End: 2025-03-31

## 2025-03-26 RX ORDER — DEXTROSE MONOHYDRATE 100 MG/ML
INJECTION, SOLUTION INTRAVENOUS CONTINUOUS PRN
Status: DISCONTINUED | OUTPATIENT
Start: 2025-03-26 | End: 2025-03-26 | Stop reason: HOSPADM

## 2025-03-26 RX ORDER — IBUPROFEN 600 MG/1
1 TABLET ORAL PRN
Status: DISCONTINUED | OUTPATIENT
Start: 2025-03-26 | End: 2025-03-26 | Stop reason: HOSPADM

## 2025-03-26 RX ORDER — POTASSIUM CHLORIDE 1500 MG/1
40 TABLET, EXTENDED RELEASE ORAL EVERY 4 HOURS
Status: COMPLETED | OUTPATIENT
Start: 2025-03-26 | End: 2025-03-26

## 2025-03-26 RX ORDER — GUAIFENESIN 600 MG/1
1200 TABLET, EXTENDED RELEASE ORAL 2 TIMES DAILY
Qty: 20 TABLET | Refills: 0 | Status: SHIPPED | OUTPATIENT
Start: 2025-03-26 | End: 2025-03-31

## 2025-03-26 RX ORDER — INSULIN LISPRO 100 [IU]/ML
0-8 INJECTION, SOLUTION INTRAVENOUS; SUBCUTANEOUS
Status: DISCONTINUED | OUTPATIENT
Start: 2025-03-26 | End: 2025-03-26 | Stop reason: HOSPADM

## 2025-03-26 RX ORDER — AZITHROMYCIN 500 MG/1
500 TABLET, FILM COATED ORAL DAILY
Qty: 5 TABLET | Refills: 0 | Status: SHIPPED | OUTPATIENT
Start: 2025-03-26 | End: 2025-03-31

## 2025-03-26 RX ADMIN — FAMOTIDINE 20 MG: 20 TABLET, FILM COATED ORAL at 07:59

## 2025-03-26 RX ADMIN — SODIUM CHLORIDE, PRESERVATIVE FREE 10 ML: 5 INJECTION INTRAVENOUS at 07:59

## 2025-03-26 RX ADMIN — GUAIFENESIN 1200 MG: 600 TABLET ORAL at 07:59

## 2025-03-26 RX ADMIN — POTASSIUM CHLORIDE 40 MEQ: 20 TABLET, EXTENDED RELEASE ORAL at 12:06

## 2025-03-26 RX ADMIN — ALBUTEROL SULFATE 2.5 MG: 2.5 SOLUTION RESPIRATORY (INHALATION) at 12:37

## 2025-03-26 RX ADMIN — ZOLPIDEM TARTRATE 5 MG: 5 TABLET ORAL at 00:10

## 2025-03-26 RX ADMIN — ALBUTEROL SULFATE 2.5 MG: 2.5 SOLUTION RESPIRATORY (INHALATION) at 08:32

## 2025-03-26 RX ADMIN — GABAPENTIN 100 MG: 100 CAPSULE ORAL at 07:59

## 2025-03-26 RX ADMIN — ENOXAPARIN SODIUM 40 MG: 100 INJECTION SUBCUTANEOUS at 07:59

## 2025-03-26 RX ADMIN — POTASSIUM CHLORIDE 40 MEQ: 20 TABLET, EXTENDED RELEASE ORAL at 07:59

## 2025-03-26 ASSESSMENT — PAIN SCALES - GENERAL
PAINLEVEL_OUTOF10: 0
PAINLEVEL_OUTOF10: 0

## 2025-03-26 NOTE — PROGRESS NOTES
Student notes are for training only, and should not be used to guide medical decision making.      Medical Student Progress Note   Admit Date:  3/24/2025 12:38 PM   Name:  Deana Rollins   Age:  74 y.o.  Sex:  female  :  1950   MRN:  [id number]   Room:  [facility]    Presenting Complaint: Shortness of Breath    Reason(s) for Admission: Hypoxemia [R09.02]  Hypokalemia [E87.6]  Hyperglycemia [R73.9]  Sepsis (HCC) [A41.9]  Multifocal pneumonia [J18.9]  Sepsis, due to unspecified organism, unspecified whether acute organ dysfunction present (HCC) [A41.9]     Hospital Course:   Deana Rollins is a 74 y.o female with a medical history of hypertension, GERD, who presented with dyspnea that progressively got worse over the past week. She stated that her daughter was also sick. During the presentation her vitals were , /56, 86% saturation on room air. This patient was placed on supplemental oxygen via nasal cannula. UA with trace leukocyte esterase, 1+ bacteria. There was a Chest x-ray done which showed severe bronchitis with mild multifocal bronchopneumonia. CT chest negative for PE moderate patchy bronchopneumonia in bilateral lobes. She received IV fluids based on sepsis protocol. Patient admitted for further management.      Subjective & 24-hour events:   The patient was followed-up on today. She stated that she felt \"pretty good\" today. Her only complaint was that when her oxygen was lowered yesterday she began to feel a little short of breath and her breathing \"felt weird.\" Today she denied shortness of breath, chest pain, chills, body aches, lower extremity pain. As of today the patient was back on the higher amount of O2 she was originally on.     Assessment & Plan:   Sepsis secondary to bilateral pneumonia  -Finish the course of azithromycin (which ends tomorrow)  -Maintain MAP greater than 65  -continue on the Mucinex 1200 mg BID  -Continue the albuterol 2.5 mg 4x daily  -Scheduled 6 minute  walk test with respiratory therapy to help confirm if she is ready for discharge     Hypokalemia  -Potassium remained below optimal range, continue potassium chloride 40 mRq Q4H  -Repeat BMP    Hypertension  -Blood pressure in controlled, continue the lisinopril 10 mg/day    Diabetes with Neuropathy  Glucose was at 107 which was down from 144 a day prior  -Continue Lantus 15 units nightly  -Continue insulin sliding scale  -Continue the gabapentin 100 mg 3x/day    Hyperlipidemia  -continue the pravastatin 80 mg nightly    Active Smoker  The patient stated she quit smoking once symptoms arose 8-10 days ago. It was discussed with this patient the harmful effects of tobacco. The patient was counseled on refraining from smoking upon return home.                  Hospital Problems:  Principal Problem:    Sepsis (HCC)  Active Problems:    Hypertension    GERD (gastroesophageal reflux disease)    Diabetes (HCC)    Dyspnea    Acute hypoxic respiratory failure (HCC)    Hypokalemia    Bilateral bronchopneumonia  Resolved Problems:    * No resolved hospital problems. *      Objective:   Patient Vitals for the past 24 hrs:   Temp Pulse Resp BP SpO2   03/26/25 0840 -- 70 18 -- 94 %   03/26/25 0748 -- 90 18 -- 93 %   03/26/25 0747 98.1 °F (36.7 °C) 89 16 122/64 93 %   03/26/25 0324 98.4 °F (36.9 °C) 94 17 124/70 99 %   03/25/25 2325 98.6 °F (37 °C) 90 18 (!) 120/58 97 %   03/25/25 2233 -- (!) 104 16 -- 92 %   03/25/25 1951 98.4 °F (36.9 °C) 94 17 (!) 116/55 96 %   03/25/25 1549 -- 98 16 -- 97 %   03/25/25 1536 98.4 °F (36.9 °C) (!) 101 15 (!) 111/57 96 %   03/25/25 1200 -- -- -- -- 94 %   03/25/25 1145 99 °F (37.2 °C) 100 -- (!) 118/56 92 %   03/25/25 1100 -- 82 16 -- 96 %       Oxygen Therapy  SpO2: 94 %  Pulse via Oximetry: 99 beats per minute  Pulse Oximeter Device Mode: Intermittent  Pulse Oximeter Device Location: Finger  O2 Device: Nasal cannula  Skin Assessment: Clean, dry, & intact  Skin Protection for O2 Device: No  O2  MG/DL    Creatinine 0.49 (L) 0.60 - 1.10 MG/DL    Est, Glom Filt Rate >90 >60 ml/min/1.73m2    Calcium 8.2 (L) 8.8 - 10.2 MG/DL   Magnesium    Collection Time: 03/26/25  5:28 AM   Result Value Ref Range    Magnesium 2.0 1.8 - 2.4 mg/dL   POCT Glucose    Collection Time: 03/26/25  7:49 AM   Result Value Ref Range    POC Glucose 114 (H) 65 - 100 mg/dL    Performed by: Dakota        Current Meds:  Current Facility-Administered Medications   Medication Dose Route Frequency    insulin lispro (HUMALOG,ADMELOG) injection vial 0-8 Units  0-8 Units SubCUTAneous 4x Daily AC & HS    glucose chewable tablet 16 g  4 tablet Oral PRN    dextrose bolus 10% 125 mL  125 mL IntraVENous PRN    Or    dextrose bolus 10% 250 mL  250 mL IntraVENous PRN    Glucagon Emergency KIT 1 mg  1 mg SubCUTAneous PRN    dextrose 10 % infusion   IntraVENous Continuous PRN    potassium chloride (KLOR-CON M) extended release tablet 40 mEq  40 mEq Oral Q4H    cefTRIAXone (ROCEPHIN) 1,000 mg in sterile water 10 mL IV syringe  1,000 mg IntraVENous Q24H    And    azithromycin (ZITHROMAX) 500 mg in sodium chloride 0.9 % 250 mL IVPB ([id number])  500 mg IntraVENous Q24H    sodium chloride flush 0.9 % injection 5-40 mL  5-40 mL IntraVENous 2 times per day    sodium chloride flush 0.9 % injection 5-40 mL  5-40 mL IntraVENous PRN    0.9 % sodium chloride infusion   IntraVENous PRN    enoxaparin (LOVENOX) injection 40 mg  40 mg SubCUTAneous Daily    ondansetron (ZOFRAN-ODT) disintegrating tablet 4 mg  4 mg Oral Q8H PRN    Or    ondansetron (ZOFRAN) injection 4 mg  4 mg IntraVENous Q6H PRN    polyethylene glycol (GLYCOLAX) packet 17 g  17 g Oral Daily PRN    acetaminophen (TYLENOL) tablet 650 mg  650 mg Oral Q6H PRN    Or    acetaminophen (TYLENOL) suppository 650 mg  650 mg Rectal Q6H PRN    lisinopril (PRINIVIL;ZESTRIL) tablet 10 mg  10 mg Oral Daily    pravastatin (PRAVACHOL) tablet 80 mg  80 mg Oral Nightly    guaiFENesin (MUCINEX) extended release

## 2025-03-26 NOTE — PROGRESS NOTES
Reviewed DC instructions with pt verbalized understanding answered all questions to the best of my ability Ivs removed

## 2025-03-26 NOTE — PLAN OF CARE
Problem: Respiratory - Adult  Goal: Achieves optimal ventilation and oxygenation  3/26/2025 1204 by Elvira Sandhu RCP  Outcome: Progressing  Flowsheets (Taken 3/26/2025 1204)  Achieves optimal ventilation and oxygenation:   Assess for changes in respiratory status   Assess for changes in mentation and behavior   Position to facilitate oxygenation and minimize respiratory effort   Oxygen supplementation based on oxygen saturation or arterial blood gases   Encourage broncho-pulmonary hygiene including cough, deep breathe, incentive spirometry   Assess and instruct to report shortness of breath or any respiratory difficulty   Respiratory therapy support as indicated      Straight cath performed per MD instructions. Pt cleaned per hospital protocol using proven wipes before and after procedure. Assisted by WARRENRGOLD Salinas. Pt appears lethargic, has call light, updated on plan of care, will continue to monitor.    Attempted to use [employer] Ipad - stating \"We don't have one.\" after being placed on continuous hold. EDMD and EDPCL notified.

## 2025-03-26 NOTE — DISCHARGE SUMMARY
Hospitalist Discharge Summary   Admit Date:  3/24/2025 12:38 PM   DC Note date: 3/26/2025  Name:  Deana Rollins   Age:  74 y.o.  Sex:  female  :  1950   MRN:  [id number]   Room:  [facility]  PCP:  Charlene Stallworth MD    Presenting Complaint: Shortness of Breath     Initial Admission Diagnosis: Hypoxemia [R09.02]  Hypokalemia [E87.6]  Hyperglycemia [R73.9]  Sepsis (HCC) [A41.9]  Multifocal pneumonia [J18.9]  Sepsis, due to unspecified organism, unspecified whether acute organ dysfunction present (HCC) [A41.9]     Problem List for this Hospitalization (present on admission):    Principal Problem:    Sepsis (HCC)  Active Problems:    Hypertension    GERD (gastroesophageal reflux disease)    Diabetes (HCC)    Dyspnea    Acute hypoxic respiratory failure (HCC)    Hypokalemia    Bilateral bronchopneumonia  Resolved Problems:    * No resolved hospital problems. *      Hospital Course:  Deana Rollins is a 74 y.o. female with medical history of hypertension, GERD who presented with shortness of breath, with worsening symptoms over the past week. Patient states her daughter is also sick at home. On presentation vitals are pertinent for , /56, 86% saturation on room air. Patient was placed on supplemental oxygen via nasal cannula. Labs are pertinent for sodium 132, potassium 3.3, glucose 388, procalcitonin 0.57, WBC 17.6. UA with trace leukocyte esterase, 1+ bacteria. Chest x-ray with severe bronchitis with mild multifocal bronchopneumonia. CT chest negative for PE moderate patchy bronchopneumonia in bilateral lobes. She received IV fluids based on sepsis protocol. Patient admitted for further management.     Patient was started on ceftriaxone and azithromycin.  Her white count started to defervesce fairly quickly.  Her supplemental oxygen was weaned down as well.  Patient continued to feel better.  Patient underwent 6-minute walk test on day of discharge and did not qualify for home  Hematocrit 30.1 (L) 35.8 - 46.3 %    MCV 92.3 82 - 102 FL    MCH 30.1 26.1 - 32.9 PG    MCHC 32.6 31.4 - 35.0 g/dL    RDW 13.2 11.9 - 14.6 %    Platelets 342 150 - 450 K/uL    MPV 9.9 9.4 - 12.3 FL    nRBC 0.00 0.0 - 0.2 K/uL    Differential Type AUTOMATED      Neutrophils % 73.6 43.0 - 78.0 %    Lymphocytes % 15.7 13.0 - 44.0 %    Monocytes % 7.9 4.0 - 12.0 %    Eosinophils % 1.4 0.5 - 7.8 %    Basophils % 0.2 0.0 - 2.0 %    Immature Granulocytes % 1.2 0.0 - 5.0 %    Neutrophils Absolute 7.63 1.70 - 8.20 K/UL    Lymphocytes Absolute 1.62 0.50 - 4.60 K/UL    Monocytes Absolute 0.82 0.10 - 1.30 K/UL    Eosinophils Absolute 0.14 0.00 - 0.80 K/UL    Basophils Absolute 0.02 0.00 - 0.20 K/UL    Immature Granulocytes Absolute 0.12 0.0 - 0.5 K/UL   Basic Metabolic Panel w/ Reflex to MG    Collection Time: 03/26/25  5:28 AM   Result Value Ref Range    Sodium 138 136 - 145 mmol/L    Potassium 3.1 (L) 3.5 - 5.1 mmol/L    Chloride 101 98 - 107 mmol/L    CO2 27 20 - 29 mmol/L    Anion Gap 11 7 - 16 mmol/L    Glucose 107 (H) 70 - 99 mg/dL    BUN 6 (L) 8 - 23 MG/DL    Creatinine 0.49 (L) 0.60 - 1.10 MG/DL    Est, Glom Filt Rate >90 >60 ml/min/1.73m2    Calcium 8.2 (L) 8.8 - 10.2 MG/DL   Magnesium    Collection Time: 03/26/25  5:28 AM   Result Value Ref Range    Magnesium 2.0 1.8 - 2.4 mg/dL   POCT Glucose    Collection Time: 03/26/25  7:49 AM   Result Value Ref Range    POC Glucose 114 (H) 65 - 100 mg/dL    Performed by: Dakota    POCT Glucose    Collection Time: 03/26/25 12:04 PM   Result Value Ref Range    POC Glucose 148 (H) 65 - 100 mg/dL    Performed by: Dakota        Recent Labs     03/24/25  1314   COVID19 Not detected       Recent Vital Data:  Patient Vitals for the past 24 hrs:   Temp Pulse Resp BP SpO2   03/26/25 1205 98.1 °F (36.7 °C) 100 18 (!) 122/55 95 %   03/26/25 0840 -- 70 18 -- 94 %   03/26/25 0748 -- 90 18 -- 93 %   03/26/25 0747 98.1 °F (36.7 °C) 89 16 122/64 93 %   03/26/25 0324 98.4

## 2025-03-26 NOTE — PROGRESS NOTES
03/26/25 1157   Resting (Room Air)   SpO2 93      During Walk (Room Air)   SpO2 89      Rate of Dyspnea 0   After Walk   SpO2 90      Rate of Dyspnea 0   Does the Patient Qualify for Home O2 No

## 2025-03-26 NOTE — PLAN OF CARE
Problem: Chronic Conditions and Co-morbidities  Goal: Patient's chronic conditions and co-morbidity symptoms are monitored and maintained or improved  3/26/2025 0815 by Monica Weinstein RN  Outcome: Progressing  3/26/2025 0451 by Mode Izquierdo RN  Outcome: Progressing  Flowsheets (Taken 3/25/2025 2030)  Care Plan - Patient's Chronic Conditions and Co-Morbidity Symptoms are Monitored and Maintained or Improved:   Monitor and assess patient's chronic conditions and comorbid symptoms for stability, deterioration, or improvement   Collaborate with multidisciplinary team to address chronic and comorbid conditions and prevent exacerbation or deterioration     Problem: Pain  Goal: Verbalizes/displays adequate comfort level or baseline comfort level  3/26/2025 0451 by Mode Izquierdo RN  Outcome: Progressing  Flowsheets (Taken 3/25/2025 2030)  Verbalizes/displays adequate comfort level or baseline comfort level:   Encourage patient to monitor pain and request assistance   Assess pain using appropriate pain scale     Problem: Safety - Adult  Goal: Free from fall injury  3/26/2025 0815 by Monica Weinstein RN  Outcome: Progressing  3/26/2025 0451 by Mode Izquierdo RN  Outcome: Progressing  Flowsheets (Taken 3/25/2025 2030)  Free From Fall Injury: Instruct family/caregiver on patient safety

## 2025-03-26 NOTE — PLAN OF CARE
Problem: Chronic Conditions and Co-morbidities  Goal: Patient's chronic conditions and co-morbidity symptoms are monitored and maintained or improved  Outcome: Progressing  Flowsheets (Taken 3/25/2025 2030)  Care Plan - Patient's Chronic Conditions and Co-Morbidity Symptoms are Monitored and Maintained or Improved:   Monitor and assess patient's chronic conditions and comorbid symptoms for stability, deterioration, or improvement   Collaborate with multidisciplinary team to address chronic and comorbid conditions and prevent exacerbation or deterioration     Problem: Pain  Goal: Verbalizes/displays adequate comfort level or baseline comfort level  Outcome: Progressing  Flowsheets (Taken 3/25/2025 2030)  Verbalizes/displays adequate comfort level or baseline comfort level:   Encourage patient to monitor pain and request assistance   Assess pain using appropriate pain scale     Problem: Safety - Adult  Goal: Free from fall injury  Outcome: Progressing  Flowsheets (Taken 3/25/2025 2030)  Free From Fall Injury: Instruct family/caregiver on patient safety     Problem: Respiratory - Adult  Goal: Achieves optimal ventilation and oxygenation  Outcome: Progressing  Flowsheets (Taken 3/25/2025 2030)  Achieves optimal ventilation and oxygenation:   Assess for changes in respiratory status   Assess for changes in mentation and behavior

## 2025-03-27 ENCOUNTER — FOLLOWUP TELEPHONE ENCOUNTER (OUTPATIENT)
Dept: CASE MANAGEMENT | Age: 75
End: 2025-03-27

## 2025-03-27 NOTE — PROGRESS NOTES
Follow up call attempted with patient. Left message for return call and PCP appointment date and time for 3/28/25 @ 12noon. Awaiting call back.

## 2025-03-29 LAB
BACTERIA SPEC CULT: NORMAL
BACTERIA SPEC CULT: NORMAL
SERVICE CMNT-IMP: NORMAL
SERVICE CMNT-IMP: NORMAL

## 2025-04-07 ENCOUNTER — FOLLOWUP TELEPHONE ENCOUNTER (OUTPATIENT)
Dept: CASE MANAGEMENT | Age: 75
End: 2025-04-07

## 2025-04-08 ENCOUNTER — FOLLOWUP TELEPHONE ENCOUNTER (OUTPATIENT)
Dept: CASE MANAGEMENT | Age: 75
End: 2025-04-08

## 2025-04-15 ENCOUNTER — FOLLOWUP TELEPHONE ENCOUNTER (OUTPATIENT)
Dept: CASE MANAGEMENT | Age: 75
End: 2025-04-15

## 2025-04-15 NOTE — PROGRESS NOTES
Follow call made with patient. Patient states she is doing very well, has stopped smoking at this time, slowly regaining strength. No new needs identified at this time. Navigator will continue to follow until 4/26/25.

## 2025-04-17 NOTE — PROGRESS NOTES
Physician Progress Note      PATIENT:               ELMER JACKSON  CSN #:                  [id number]  :                       1950  ADMIT DATE:       3/24/2025 12:38 PM  DISCH DATE:        3/26/2025 2:11 PM  RESPONDING  PROVIDER #:        Darrell Hagan MD          QUERY TEXT:    Sepsis and acute organ dysfunction of acute hypoxic respiratory failure are   documented in the medical record H&P, Dr. Hagan's progress note from 3/25 and   the discharge summary. Please clarify the relationship, if any, between Sepsis   and acute hypoxic respiratory failure.    The clinical indicators include:  --H&P from 3/24, Dr. Hagan's progress note from 3/25, and discharge summary from   3/26 all state \"sepsis\" and \"acute hypoxic respiratory failure\"  --Per labs, WBC 17.6, PCT 0.57, lactic acid sepsis 1.6  --Per vitals flowsheet temp 99, -126, RR 15-35, SpO2 86% on RA, placed   on 3 LPM with SpO2 92%  --Per assessment flowsheet, labored breathing, dyspnea at rest and with   exertion  --Per MAR, IV Rocephin and IV Zithromax, IVF bolus of 1,750mL total  Options provided:  -- Acute hypoxic respiratory failure is related to sepsis  -- Acute hypoxic respiratory failure is unrelated to sepsis  -- Other - I will add my own diagnosis  -- Disagree - Not applicable / Not valid  -- Disagree - Clinically unable to determine / Unknown  -- Refer to Clinical Documentation Reviewer    PROVIDER RESPONSE TEXT:    This patient has acute hypoxic respiratory failure related to sepsis.    Query created by: Donna Hutchinson on 2025 2:31 PM      Electronically signed by:  Darrell Hagan MD 2025 7:59 PM

## 2025-04-22 ENCOUNTER — FOLLOWUP TELEPHONE ENCOUNTER (OUTPATIENT)
Dept: CASE MANAGEMENT | Age: 75
End: 2025-04-22

## 2025-04-28 ENCOUNTER — FOLLOWUP TELEPHONE ENCOUNTER (OUTPATIENT)
Dept: CASE MANAGEMENT | Age: 75
End: 2025-04-28